# Patient Record
Sex: MALE | Race: BLACK OR AFRICAN AMERICAN
[De-identification: names, ages, dates, MRNs, and addresses within clinical notes are randomized per-mention and may not be internally consistent; named-entity substitution may affect disease eponyms.]

---

## 2017-03-31 ENCOUNTER — HOSPITAL ENCOUNTER (EMERGENCY)
Dept: HOSPITAL 84 - D.ER | Age: 47
Discharge: HOME | End: 2017-03-31
Payer: MEDICAID

## 2017-03-31 VITALS — BODY MASS INDEX: 26.7 KG/M2

## 2017-03-31 DIAGNOSIS — R07.9: Primary | ICD-10-CM

## 2017-03-31 LAB
ALBUMIN SERPL-MCNC: 4.1 G/DL (ref 3.4–5)
ALP SERPL-CCNC: 46 U/L (ref 46–116)
ALT SERPL-CCNC: 24 U/L (ref 10–68)
ANION GAP SERPL CALC-SCNC: 13.3 MMOL/L (ref 8–16)
BASOPHILS NFR BLD AUTO: 0.2 % (ref 0–2)
BILIRUB SERPL-MCNC: 0.58 MG/DL (ref 0.2–1.3)
BUN SERPL-MCNC: 14 MG/DL (ref 7–18)
CALCIUM SERPL-MCNC: 9.1 MG/DL (ref 8.5–10.1)
CHLORIDE SERPL-SCNC: 104 MMOL/L (ref 98–107)
CHOLEST/HDLC SERPL: 2.1 RATIO (ref 2.3–4.9)
CK MB SERPL-MCNC: 1.2 U/L (ref 0–3.6)
CK SERPL-CCNC: 270 UL (ref 21–232)
CO2 SERPL-SCNC: 26.9 MMOL/L (ref 21–32)
CREAT SERPL-MCNC: 1.1 MG/DL (ref 0.6–1.3)
EOSINOPHIL NFR BLD: 0.6 % (ref 0–7)
ERYTHROCYTE [DISTWIDTH] IN BLOOD BY AUTOMATED COUNT: 13.5 % (ref 11.5–14.5)
GLOBULIN SER-MCNC: 4 G/L
GLUCOSE SERPL-MCNC: 100 MG/DL (ref 74–106)
HCT VFR BLD CALC: 43.7 % (ref 42–54)
HDLC SERPL-MCNC: 101 MG/DL (ref 32–96)
HGB BLD-MCNC: 14.8 G/DL (ref 13.5–17.5)
IMM GRANULOCYTES NFR BLD: 0 % (ref 0–5)
LDL-HDL RATIO: 1.1 RATIO (ref 1.5–3.5)
LDLC SERPL-MCNC: 107 MG/DL (ref 0–100)
LYMPHOCYTES NFR BLD AUTO: 33.4 % (ref 15–50)
MCH RBC QN AUTO: 30.5 PG (ref 26–34)
MCHC RBC AUTO-ENTMCNC: 33.9 G/DL (ref 31–37)
MCV RBC: 89.9 FL (ref 80–100)
MONOCYTES NFR BLD: 9.5 % (ref 2–11)
NEUTROPHILS NFR BLD AUTO: 56.3 % (ref 40–80)
OSMOLALITY SERPL CALC.SUM OF ELEC: 279 MOSM/KG (ref 275–300)
PLATELET # BLD: 213 10X3/UL (ref 130–400)
PMV BLD AUTO: 10.1 FL (ref 7.4–10.4)
POTASSIUM SERPL-SCNC: 4.2 MMOL/L (ref 3.5–5.1)
PROT SERPL-MCNC: 8.1 G/DL (ref 6.4–8.2)
RBC # BLD AUTO: 4.86 10X6/UL (ref 4.2–6.1)
SODIUM SERPL-SCNC: 140 MMOL/L (ref 136–145)
TRIGL SERPL-MCNC: 41 MG/DL (ref 30–200)
TROPONIN I SERPL-MCNC: < 0.017 NG/ML (ref 0–0.06)
WBC # BLD AUTO: 4.8 10X3/UL (ref 4.8–10.8)

## 2017-04-14 NOTE — CN
PATIENT NAME:MATIAS TREADWELL                            MEDICAL RECORD: [id number]
: 72                                              LOCATION:.ER       
ADMIT DATE:                                                ACCOUNT: [id number]
CONSULTING PHYSICIAN:    SAMIR GARCIA MD             
                                               
REFERRING PHYSICIAN:     KIKA REYNAGA MD              
 
 
DATE OF CONSULTATION:  2017
 
Cardiology Consultation
 
ADMITTING DIAGNOSES:
1.  Chest pain, atypical.
2.  Coronary artery disease.
3.  Previous percutaneous transluminal coronary angioplasty stent.
 
HISTORY OF PRESENT ILLNESS:  This is a gentleman who had a LAD stent last year,
who presents with chest pain.  His chest pain is different than that of his
previous angina.  His chest pain has been present for 2 weeks.  He had started
smoking again over the past 2 weeks.  It is more of a sharp stabbing pain, it is
worse with deep inspiration.  It is not like his previous angina.  His EKG is
with no ST-T abnormalities.
 
PHYSICAL EXAMINATION:
GENERAL APPEARANCE:  Well-nourished, well-developed, appears stated age.  Level
of distress, comfortable.
PSYCHIATRIC:  Mental status, alert, normal affect.  Orientation, oriented to
time, place and person.
EYES:  Lids and conjunctiva, noninjected.  No discharge, no pallor.
ENT:  Lips, teeth, gums, normal dentition.  Oropharynx, no cyanosis, no pallor.
NECK:  Carotid arteries, bilateral normal upstroke, no bruits, no thrills.
JUGULAR VEINS:  No jugular venous pressure or distention.
CERVICAL LYMPH NODES:  Nontender, nonenlarged.
THYROID:  Not enlarged.  Nontender.  No nodules.
LUNGS:  Respiratory effort, unlabored.
CHEST:  Normal curvature.  No thoracic deformity.  No chest wall tenderness. 
Percussion, resonant.  Auscultation, clear.  No wheezes, no rales, no rhonchi.
CARDIOVASCULAR:  Precordial exam, nondisplaced.  No heaves or pericardial
thrills.  Rate and rhythm, regular.  Heart sounds, normal S1, normal S2.  No S3,
no gallop, no rub.  Systolic murmur, not heard.  Diastolic murmur, not heard.
EXTREMITIES:  No cyanosis, no edema.  Peripheral pulses, full and equal in all
extremities, except as noted.  No bruits appreciated.
ABDOMEN:  Soft, nondistended.  Normal aorta.  No bruit.  Nontender.  No masses. 
Liver, nontender, no hepatomegaly.  Spleen, nontender, no splenomegaly.
MUSCULOSKELETAL:  No joint tenderness.  No joint swelling.  No erythema.
NEUROLOGICAL:  Normal gait, normal strength, normal tone.
SKIN:  Warm and dry.
 
REVIEW OF SYSTEMS:  The patient reports easy bruising but reports no swollen
glands. The patient reports no fever, no night sweats, no significant weight
gain, no significant weight loss.  No significant exercise tolerance.  The
patient reports no dry eyes, no irritation, no vision change.  Patient reports
no difficulty hearing and no ear pain.  Patient reports no frequent nose bleeds
or nose and sinus problems.  Patient reports on arm pain on exertion.   No
shortness of breath while lying down.  No history of heart murmur.  Patient
reports no cough, no wheezing or coughing up blood.  Patient reports no
abdominal pain, no vomiting.  Normal appetite.  No diarrhea and not vomiting
 
 
 
CONSULT REPORT                                 [id number]    MATIAS TREADWELL.  No nausea and no constipation.  Patient reports no incontinence.  No
difficulty urinating.  No hematuria.  No increased frequency.  Patient reports
no muscle aches.  No weakness, no arthralgias, no back pain.  No swelling of the
extremities.  Patient reports no abnormal mole, no jaundice, no rashes.  Reports
no loss of consciousness.  No weakness and no numbness.  No seizures, dizziness,
or headaches.  The patient reports no depression, no sleep disturbance, feeling
safe in a relationship and no alcohol abuse.  Patient reports on fatigue. 
Reports no runny nose or sinus pressure.  No itching, no hives, and no frequent
sneezing.
 
OVERALL IMPRESSION:  Chest pain, atypical, not like his previous angina with a
normal EKG.  Most likely, this is noncardiac and pulmonary or pleuritic in
nature.  No other cardiac workup is necessary at this time.
 
TRANSINT:[id number] Voice Confirmation ID: [id number] DOCUMENT ID: [id number]
                                           
                                           SAMIR GARCIA MD             
 
 
 
Electronically Signed by SAMIR GARCIA on 17 at 1019
 
 
 
 
 
 
 
 
 
 
 
 
 
 
 
 
 
 
 
 
 
 
 
 
 
 
CC:                                                             [id number]
DICTATION DATE: 17 1444     :     17 1817      DEP ER  
                                                                      17
[facility]                                          
[address] [id number]

## 2019-01-21 ENCOUNTER — HOSPITAL ENCOUNTER (EMERGENCY)
Dept: HOSPITAL 84 - D.ER | Age: 49
LOS: 1 days | Discharge: HOME | End: 2019-01-22
Payer: MEDICAID

## 2019-01-21 VITALS — BODY MASS INDEX: 26.57 KG/M2 | WEIGHT: 165.35 LBS | HEIGHT: 66 IN

## 2019-01-21 DIAGNOSIS — Z86.73: ICD-10-CM
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
## 2019-01-21 DIAGNOSIS — S89.91XA: Primary | ICD-10-CM

## 2019-01-22 VITALS — DIASTOLIC BLOOD PRESSURE: 62 MMHG | SYSTOLIC BLOOD PRESSURE: 119 MMHG
